# Patient Record
Sex: FEMALE | Race: WHITE | NOT HISPANIC OR LATINO
[De-identification: names, ages, dates, MRNs, and addresses within clinical notes are randomized per-mention and may not be internally consistent; named-entity substitution may affect disease eponyms.]

---

## 2017-10-25 ENCOUNTER — APPOINTMENT (OUTPATIENT)
Dept: NEUROLOGY | Facility: CLINIC | Age: 57
End: 2017-10-25
Payer: MEDICAID

## 2017-10-25 VITALS
HEIGHT: 65 IN | SYSTOLIC BLOOD PRESSURE: 110 MMHG | HEART RATE: 76 BPM | WEIGHT: 134 LBS | BODY MASS INDEX: 22.33 KG/M2 | DIASTOLIC BLOOD PRESSURE: 78 MMHG | TEMPERATURE: 98.1 F

## 2017-10-25 DIAGNOSIS — Z82.49 FAMILY HISTORY OF ISCHEMIC HEART DISEASE AND OTHER DISEASES OF THE CIRCULATORY SYSTEM: ICD-10-CM

## 2017-10-25 DIAGNOSIS — Z87.09 PERSONAL HISTORY OF OTHER DISEASES OF THE RESPIRATORY SYSTEM: ICD-10-CM

## 2017-10-25 DIAGNOSIS — Z86.19 PERSONAL HISTORY OF OTHER INFECTIOUS AND PARASITIC DISEASES: ICD-10-CM

## 2017-10-25 DIAGNOSIS — Z86.69 PERSONAL HISTORY OF OTHER DISEASES OF THE NERVOUS SYSTEM AND SENSE ORGANS: ICD-10-CM

## 2017-10-25 DIAGNOSIS — Z80.9 FAMILY HISTORY OF MALIGNANT NEOPLASM, UNSPECIFIED: ICD-10-CM

## 2017-10-25 DIAGNOSIS — Z87.898 PERSONAL HISTORY OF OTHER SPECIFIED CONDITIONS: ICD-10-CM

## 2017-10-25 DIAGNOSIS — Z80.0 FAMILY HISTORY OF MALIGNANT NEOPLASM OF DIGESTIVE ORGANS: ICD-10-CM

## 2017-10-25 DIAGNOSIS — F17.200 NICOTINE DEPENDENCE, UNSPECIFIED, UNCOMPLICATED: ICD-10-CM

## 2017-10-25 DIAGNOSIS — F15.90 OTHER STIMULANT USE, UNSPECIFIED, UNCOMPLICATED: ICD-10-CM

## 2017-10-25 PROBLEM — Z00.00 ENCOUNTER FOR PREVENTIVE HEALTH EXAMINATION: Noted: 2017-10-25

## 2017-10-25 PROCEDURE — 99205 OFFICE O/P NEW HI 60 MIN: CPT

## 2017-10-30 ENCOUNTER — OUTPATIENT (OUTPATIENT)
Dept: OUTPATIENT SERVICES | Facility: HOSPITAL | Age: 57
LOS: 1 days | End: 2017-10-30
Payer: MEDICAID

## 2017-10-30 DIAGNOSIS — G40.909 EPILEPSY, UNSPECIFIED, NOT INTRACTABLE, WITHOUT STATUS EPILEPTICUS: ICD-10-CM

## 2017-10-30 PROCEDURE — 95819 EEG AWAKE AND ASLEEP: CPT

## 2017-10-30 PROCEDURE — 95957 EEG DIGITAL ANALYSIS: CPT

## 2017-10-30 PROCEDURE — 95819 EEG AWAKE AND ASLEEP: CPT | Mod: 26

## 2017-10-30 NOTE — EEG REPORT - COMMENTS
This awake EEG is within normal limits.  Sleep was not recorded.    Ganga Honeycutt MD  EEG / Epilepsy Attending Physician

## 2017-12-05 ENCOUNTER — RESULT REVIEW (OUTPATIENT)
Age: 57
End: 2017-12-05

## 2018-01-29 ENCOUNTER — RESULT REVIEW (OUTPATIENT)
Age: 58
End: 2018-01-29

## 2018-04-18 ENCOUNTER — APPOINTMENT (OUTPATIENT)
Dept: NEUROLOGY | Facility: CLINIC | Age: 58
End: 2018-04-18
Payer: MEDICAID

## 2018-04-18 VITALS
SYSTOLIC BLOOD PRESSURE: 112 MMHG | BODY MASS INDEX: 22.82 KG/M2 | HEIGHT: 65 IN | TEMPERATURE: 97.8 F | WEIGHT: 137 LBS | DIASTOLIC BLOOD PRESSURE: 78 MMHG | HEART RATE: 94 BPM

## 2018-04-18 PROCEDURE — 99214 OFFICE O/P EST MOD 30 MIN: CPT

## 2018-07-02 ENCOUNTER — RESULT REVIEW (OUTPATIENT)
Age: 58
End: 2018-07-02

## 2018-07-05 ENCOUNTER — APPOINTMENT (OUTPATIENT)
Dept: NEUROLOGY | Facility: CLINIC | Age: 58
End: 2018-07-05

## 2018-08-13 ENCOUNTER — APPOINTMENT (OUTPATIENT)
Dept: NEUROLOGY | Facility: CLINIC | Age: 58
End: 2018-08-13
Payer: MEDICAID

## 2018-08-13 VITALS
SYSTOLIC BLOOD PRESSURE: 106 MMHG | HEIGHT: 65 IN | OXYGEN SATURATION: 98 % | TEMPERATURE: 97.7 F | WEIGHT: 138 LBS | DIASTOLIC BLOOD PRESSURE: 76 MMHG | BODY MASS INDEX: 22.99 KG/M2 | HEART RATE: 76 BPM

## 2018-08-13 PROCEDURE — 99214 OFFICE O/P EST MOD 30 MIN: CPT

## 2018-08-13 RX ORDER — LACOSAMIDE 100 MG/1
100 TABLET, FILM COATED ORAL
Qty: 60 | Refills: 5 | Status: DISCONTINUED | COMMUNITY
End: 2018-08-13

## 2018-08-21 ENCOUNTER — APPOINTMENT (OUTPATIENT)
Dept: NEUROLOGY | Facility: CLINIC | Age: 58
End: 2018-08-21
Payer: MEDICAID

## 2018-08-21 PROCEDURE — 95819 EEG AWAKE AND ASLEEP: CPT

## 2018-09-04 ENCOUNTER — OTHER (OUTPATIENT)
Age: 58
End: 2018-09-04

## 2018-11-26 ENCOUNTER — APPOINTMENT (OUTPATIENT)
Dept: NEUROLOGY | Facility: CLINIC | Age: 58
End: 2018-11-26
Payer: MEDICAID

## 2018-11-26 VITALS
SYSTOLIC BLOOD PRESSURE: 102 MMHG | WEIGHT: 135 LBS | TEMPERATURE: 98 F | OXYGEN SATURATION: 98 % | DIASTOLIC BLOOD PRESSURE: 70 MMHG | BODY MASS INDEX: 22.49 KG/M2 | HEIGHT: 65 IN | HEART RATE: 88 BPM

## 2018-11-26 PROCEDURE — 95886 MUSC TEST DONE W/N TEST COMP: CPT | Mod: 50

## 2018-11-26 PROCEDURE — 95913 NRV CNDJ TEST 13/> STUDIES: CPT

## 2018-12-06 ENCOUNTER — APPOINTMENT (OUTPATIENT)
Dept: NEUROLOGY | Facility: CLINIC | Age: 58
End: 2018-12-06
Payer: MEDICAID

## 2018-12-06 VITALS
SYSTOLIC BLOOD PRESSURE: 130 MMHG | WEIGHT: 131 LBS | HEART RATE: 80 BPM | HEIGHT: 65 IN | BODY MASS INDEX: 21.83 KG/M2 | OXYGEN SATURATION: 98 % | DIASTOLIC BLOOD PRESSURE: 77 MMHG

## 2018-12-06 DIAGNOSIS — R29.898 OTHER SYMPTOMS AND SIGNS INVOLVING THE MUSCULOSKELETAL SYSTEM: ICD-10-CM

## 2018-12-06 PROCEDURE — 99214 OFFICE O/P EST MOD 30 MIN: CPT

## 2018-12-06 RX ORDER — LEVETIRACETAM 750 MG/1
750 TABLET, FILM COATED ORAL TWICE DAILY
Qty: 120 | Refills: 5 | Status: DISCONTINUED | COMMUNITY
End: 2018-12-06

## 2018-12-07 LAB
FOLATE SERPL-MCNC: >20 NG/ML
HBA1C MFR BLD HPLC: 5.8 %
T PALLIDUM AB SER QL IA: NEGATIVE
T3 SERPL-MCNC: 125 NG/DL
T4 SERPL-MCNC: 8 UG/DL
TSH SERPL-ACNC: 2.07 UIU/ML
VIT B12 SERPL-MCNC: 869 PG/ML

## 2018-12-13 LAB — METHYLMALONATE SERPL-SCNC: 256 NMOL/L

## 2019-01-16 ENCOUNTER — OUTPATIENT (OUTPATIENT)
Dept: OUTPATIENT SERVICES | Facility: HOSPITAL | Age: 59
LOS: 1 days | End: 2019-01-16
Payer: MEDICAID

## 2019-01-16 ENCOUNTER — APPOINTMENT (OUTPATIENT)
Dept: MRI IMAGING | Facility: HOSPITAL | Age: 59
End: 2019-01-16
Payer: MEDICAID

## 2019-01-16 DIAGNOSIS — R41.3 OTHER AMNESIA: ICD-10-CM

## 2019-01-16 PROCEDURE — 70551 MRI BRAIN STEM W/O DYE: CPT

## 2019-01-16 PROCEDURE — 70551 MRI BRAIN STEM W/O DYE: CPT | Mod: 26

## 2019-03-11 ENCOUNTER — APPOINTMENT (OUTPATIENT)
Dept: NEUROLOGY | Facility: CLINIC | Age: 59
End: 2019-03-11
Payer: MEDICAID

## 2019-03-11 VITALS
OXYGEN SATURATION: 98 % | SYSTOLIC BLOOD PRESSURE: 118 MMHG | HEIGHT: 65 IN | WEIGHT: 131 LBS | BODY MASS INDEX: 21.83 KG/M2 | HEART RATE: 70 BPM | DIASTOLIC BLOOD PRESSURE: 76 MMHG | TEMPERATURE: 98 F

## 2019-03-11 DIAGNOSIS — R41.3 OTHER AMNESIA: ICD-10-CM

## 2019-03-11 DIAGNOSIS — G56.03 CARPAL TUNNEL SYNDROM,BILATERAL UPPER LIMBS: ICD-10-CM

## 2019-03-11 PROCEDURE — 99214 OFFICE O/P EST MOD 30 MIN: CPT

## 2019-03-11 NOTE — DATA REVIEWED
[de-identified] : images reviwed: EXAM: MR BRAIN \par \par \par PROCEDURE DATE: 01/16/2019 \par \par \par \par INTERPRETATION: CLINICAL STATEMENT: Memory problem \par \par TECHNIQUE: MRI of the brain was performed without gadolinium. \par \par COMPARISON: None. \par \par FINDINGS: \par There are few T2 prolongation signal abnormalities in the periventricular \par subcortical white matter likely related to mild chronic microvascular \par ischemic changes. \par \par Incidental focal gradient susceptibility effect noted in the darwin which may \par be related to chronic microhemorrhage or cavernous angioma.. \par \par There is no acute parenchymal hemorrhage, mass effect or midline shift. \par There is no extra-axial fluid collection. There is no hydrocephalus. There \par is no acute infarct. \par \par Mucosal thickening paranasal sinuses with retention cyst/polyps. Hypoplastic \par left maxillary sinus. Nonspecific polypoid lesions noted in the nasal cavity \par which could be better evaluated with direct visualization. \par \par Nonspecific low T1 bone marrow signal which may be related to red marrow \par hyperplasia in the correct clinical setting.  [de-identified] : HbA1C 5.8\par TFT and b12/folate normal. RPR NR.\par Bone density notable for osteopenia

## 2019-03-11 NOTE — ASSESSMENT
[FreeTextEntry1] : Epilepsy, with generalized tonic-clonic seizures, stable, last seizure was in 2013\par Will continue Keppra 1500 mg twice a day \par patient has been off Vimpat 100mg 1 tab twice a day for about 1 year\par will refer to epilepsy clinic, will need LTM\par Seizure and fall precautions\par The patient does not drive\par The patient had a bone scan shows osteopenia\par Has seizures provoked by looking at a computer and TV, blinking lights\par \par Carpal tunnel syndrome bilaterally, mild; improved with splints\par wrist splint during sleep as needed\par \par Memory problem is likely related to normal aging but will refer for LTM to evaluate for non convulsive seiizures\par \par Osteopenia\par on ca/vit D\par patient will fu with PMD

## 2019-03-11 NOTE — HISTORY OF PRESENT ILLNESS
[FreeTextEntry1] : Patient has history of epilepsy and is here for seizures. Seizures started at age 11, characterized to start a staring spells with eye rolling. At age 21, the patient started having generalized tonic-clonic seizures with tongue bite and no bowel bladder incontinence. She has an aura, described as feeling as a zombie about 2 hours prior to the event. The seizures are usually triggered by blinking lights, sirens, TV and computer screens. The patient has been taking Keppra 750 mg 2 tablets twice a day as well as Vimpat 100 mg twice a day. The Vimpat was started 2013, she has been off since 2018.  The patient has been previously on Dilantin and phenobarbital.\par \par Patient's last generalized tonic clonic seizure was in 2013. She says that her seizures are provoked by flashing lights, computer screen and TV; this has limited her from employment opportunities. \par \par Additionally, the patient has been having right hand weakness and numbness. She had ncs/emg showed bl CTS, patient tried wrist splints with improvement symptoms.\par \par The patient has some episodes of of memory loss going on for 2 years, described as forgetting what she is about to do or where she is going; memory returns quickly. \par \par She had a recent mechanical fall without LOC or head trauma.  No weakness or sensory symptoms, no b/b/saddle anesthesia.\par

## 2019-03-11 NOTE — PHYSICAL EXAM
[General Appearance - Alert] : alert [General Appearance - In No Acute Distress] : in no acute distress [Person] : oriented to person [Place] : oriented to place [Time] : oriented to time [Registration Intact] : recent registration memory intact [Concentration Intact] : normal concentrating ability [Visual Intact] : visual attention was ~T not ~L decreased [Comprehension] : comprehension intact [Vocabulary] : adequate range of vocabulary [Cranial Nerves Optic (II)] : visual acuity intact bilaterally,  visual fields full to confrontation, pupils equal round and reactive to light [Cranial Nerves Oculomotor (III)] : extraocular motion intact [Cranial Nerves Trigeminal (V)] : facial sensation intact symmetrically [Cranial Nerves Facial (VII)] : face symmetrical [Motor Tone] : muscle tone was normal in all four extremities [Motor Strength] : muscle strength was normal in all four extremities [Sensation Tactile Decrease] : light touch was intact [Abnormal Walk] : normal gait [Balance] : balance was intact

## 2019-03-27 ENCOUNTER — APPOINTMENT (OUTPATIENT)
Dept: NEUROLOGY | Facility: CLINIC | Age: 59
End: 2019-03-27
Payer: MEDICAID

## 2019-03-27 VITALS
BODY MASS INDEX: 21.83 KG/M2 | SYSTOLIC BLOOD PRESSURE: 126 MMHG | HEIGHT: 65 IN | WEIGHT: 131 LBS | HEART RATE: 78 BPM | DIASTOLIC BLOOD PRESSURE: 82 MMHG

## 2019-03-27 DIAGNOSIS — G40.909 EPILEPSY, UNSPECIFIED, NOT INTRACTABLE, W/OUT STATUS EPILEPTICUS: ICD-10-CM

## 2019-03-27 PROCEDURE — 99214 OFFICE O/P EST MOD 30 MIN: CPT

## 2019-03-27 NOTE — DISCUSSION/SUMMARY
[Well-controlled] : well-controlled [Generalized] : generalized  [Risks Associated with Driving/[place] Law] : As per my usual protocol, the patient was advised in regards to risks and driving privileges associated with the [place] Guidelines.  [Safety Recommendations] : The patient was advised in regards to the risk of seizures and general seizure safety recommendations including not to be bathing alone, climbing to high places and operating heavy machinery. [Compliance with Medications] : The importance of compliance with medications was reinforced. [Medication Side Effects] : High frequency and serious potential medication adverse effects were reviewed with the patient, including but not exclusive to psychiatric effects.  Information sheets on medication side effects were made available to the patient in our clinic.  The patient or advocate agrees to notify us for any concerns. [Bone Health Education] : Patient was educated in regards to bone health and an increased risk of osteoporosis in patients with epilepsy. [Sleep Hygiene/Sleep Disruption Risks] : Sleep hygiene and the risks of sleep disruption were discussed. [EMU Consent:] : As per our usual protocol, staff discussed video EEG monitoring for the purpose of diagnosis, treatment decisions, teaching, research or publication (if de-identified).  Patient aware recording is continuous (24hrs/day), that they may be under constant observation (with exceptions), and that portions of the video EEG will be stored digitally.  In some cases AEDs may be adjusted to allow for observable seizure activity. The anticipated benefits of the study, the foreseeable risks associated with the procedure including experiencing seizures, which could be more severe than usual. The risk from seizures includes falls, fractures, muscle injury, dental injury, postictal psychiatric symptoms, and heart rate or breathing abnormalities. There will be a risk of experiencing skin irritation or breakdown from the electrodes being placed on the skin.

## 2019-03-30 PROBLEM — G40.909 EPILEPSY: Noted: 2017-10-25

## 2019-03-30 NOTE — PHYSICAL EXAM
[FreeTextEntry1] : MS: Awake Alert and Oriented to person time and place \par Follows simple and complex commands\par No anomia, No aphasia, No dysarthria \par CN: PERRL (irregular left pupil shape), EOMI, VF intact, no facial asymmetry, tongue midline, shoulder shrug equal\par Motor: 5/5 throughout no drift\par Sensation: intact to LT\par DTRs: 2+ throughout\par Coordination: FTN intact, no gross tremors \par Gait: intact \par

## 2019-03-30 NOTE — END OF VISIT
[FreeTextEntry3] : Ms. TALIA DAS was seen with Epilepsy fellow, Dr. Trini Howell.  I reviewed history with patient and family, examined Ms. DAS , and edited the note.

## 2019-03-30 NOTE — ASSESSMENT
[FreeTextEntry1] : MS. GARCIA is a 58 yr old LH F whom presents  consultation for management of known  seizure disorder. Doing relatively well on levetiracetam 1500 q12, but has history of break through convulsion once or twice a year. \par \par *Possible Primary Generalized Seizures\par -EMU for interictal evaluation; NEEDS PHOTIC STIMULATION. During EMU eval will decrease levetiracetam 750 bid and monitor interictal. Resume levetiracetam 1500 at discharge. May also consider starting zonisamide or topiramate at discharge, depending on findings. \par -c/w LEV 1500mg bid \par -RTC 2 mo (preferably Weds afternoon)\par \par Greater than 50% of the encounter time was spent on counseling and coordination of care for reviewing records in [employer], discussion with patient regarding plan. I have spent 60 minutes of face to face time with the patient reviewing the cause of seizures or seizure-like events, assessing the risk of recurrence, educating the patient or family to recognize seizures, and discussing possible treatment options and possible side effects of seizure medications. I also discussed seizure safety, and ways of reducing seizure risk. \par
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
## 2019-06-19 ENCOUNTER — APPOINTMENT (OUTPATIENT)
Dept: NEUROLOGY | Facility: CLINIC | Age: 59
End: 2019-06-19
Payer: MEDICAID

## 2019-06-19 VITALS
SYSTOLIC BLOOD PRESSURE: 132 MMHG | DIASTOLIC BLOOD PRESSURE: 82 MMHG | BODY MASS INDEX: 23.32 KG/M2 | HEIGHT: 65 IN | WEIGHT: 140 LBS | HEART RATE: 76 BPM

## 2019-06-19 DIAGNOSIS — Z82.0 FAMILY HISTORY OF EPILEPSY AND OTHER DISEASES OF THE NERVOUS SYSTEM: ICD-10-CM

## 2019-06-19 PROCEDURE — 99213 OFFICE O/P EST LOW 20 MIN: CPT
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
## 2019-06-19 NOTE — ASSESSMENT
[FreeTextEntry1] : MS. GARCIA is a 58 yr old LH F whom presents  consultation for management of known  seizure disorder. Doing relatively well on levetiracetam 1500 q12, but has history of break through convulsion once or twice a year. Ms. DAS did not get EMU eval - plan is to better characterize seizure type in order to select optimal treatment.  \par \par *Possible Primary Generalized Seizures\par -EMU for interictal evaluation; NEEDS PHOTIC STIMULATION. During EMU eval will decrease levetiracetam 750 bid and monitor interictal. Resume levetiracetam 1500 at discharge. May also consider starting zonisamide at discharge, depending on findings. \par -c/w LEV 1500mg bid \par -RTC 4-6 wk\par

## 2019-07-01 ENCOUNTER — INPATIENT (INPATIENT)
Facility: HOSPITAL | Age: 59
LOS: 1 days | Discharge: HOME | DRG: 101 | End: 2019-07-03
Attending: PSYCHIATRY & NEUROLOGY | Admitting: PSYCHIATRY & NEUROLOGY
Payer: MEDICAID

## 2019-07-01 ENCOUNTER — OUTPATIENT (OUTPATIENT)
Dept: OUTPATIENT SERVICES | Facility: HOSPITAL | Age: 59
LOS: 1 days | End: 2019-07-01

## 2019-07-01 VITALS
HEART RATE: 61 BPM | RESPIRATION RATE: 18 BRPM | DIASTOLIC BLOOD PRESSURE: 80 MMHG | HEIGHT: 66 IN | SYSTOLIC BLOOD PRESSURE: 126 MMHG | TEMPERATURE: 98 F | OXYGEN SATURATION: 94 % | WEIGHT: 132.28 LBS

## 2019-07-01 DIAGNOSIS — G40.909 EPILEPSY, UNSPECIFIED, NOT INTRACTABLE, WITHOUT STATUS EPILEPTICUS: ICD-10-CM

## 2019-07-01 LAB
ALBUMIN SERPL ELPH-MCNC: 4.2 G/DL — SIGNIFICANT CHANGE UP (ref 3.3–5)
ALP SERPL-CCNC: 47 U/L — SIGNIFICANT CHANGE UP (ref 40–120)
ALT FLD-CCNC: 10 U/L — SIGNIFICANT CHANGE UP (ref 10–45)
ANION GAP SERPL CALC-SCNC: 12 MMOL/L — SIGNIFICANT CHANGE UP (ref 5–17)
AST SERPL-CCNC: 16 U/L — SIGNIFICANT CHANGE UP (ref 10–40)
BASOPHILS # BLD AUTO: 0.09 K/UL — SIGNIFICANT CHANGE UP (ref 0–0.2)
BASOPHILS NFR BLD AUTO: 1.4 % — SIGNIFICANT CHANGE UP (ref 0–2)
BILIRUB SERPL-MCNC: 0.4 MG/DL — SIGNIFICANT CHANGE UP (ref 0.2–1.2)
BUN SERPL-MCNC: 13 MG/DL — SIGNIFICANT CHANGE UP (ref 7–23)
CALCIUM SERPL-MCNC: 9.3 MG/DL — SIGNIFICANT CHANGE UP (ref 8.4–10.5)
CHLORIDE SERPL-SCNC: 100 MMOL/L — SIGNIFICANT CHANGE UP (ref 96–108)
CO2 SERPL-SCNC: 26 MMOL/L — SIGNIFICANT CHANGE UP (ref 22–31)
CREAT SERPL-MCNC: 0.89 MG/DL — SIGNIFICANT CHANGE UP (ref 0.5–1.3)
EOSINOPHIL # BLD AUTO: 0.24 K/UL — SIGNIFICANT CHANGE UP (ref 0–0.5)
EOSINOPHIL NFR BLD AUTO: 3.6 % — SIGNIFICANT CHANGE UP (ref 0–6)
GLUCOSE SERPL-MCNC: 92 MG/DL — SIGNIFICANT CHANGE UP (ref 70–99)
HCT VFR BLD CALC: 39.9 % — SIGNIFICANT CHANGE UP (ref 34.5–45)
HGB BLD-MCNC: 13.2 G/DL — SIGNIFICANT CHANGE UP (ref 11.5–15.5)
IMM GRANULOCYTES NFR BLD AUTO: 0.2 % — SIGNIFICANT CHANGE UP (ref 0–1.5)
LYMPHOCYTES # BLD AUTO: 2.34 K/UL — SIGNIFICANT CHANGE UP (ref 1–3.3)
LYMPHOCYTES # BLD AUTO: 35.6 % — SIGNIFICANT CHANGE UP (ref 13–44)
MCHC RBC-ENTMCNC: 31.6 PG — SIGNIFICANT CHANGE UP (ref 27–34)
MCHC RBC-ENTMCNC: 33.1 GM/DL — SIGNIFICANT CHANGE UP (ref 32–36)
MCV RBC AUTO: 95.5 FL — SIGNIFICANT CHANGE UP (ref 80–100)
MONOCYTES # BLD AUTO: 0.7 K/UL — SIGNIFICANT CHANGE UP (ref 0–0.9)
MONOCYTES NFR BLD AUTO: 10.6 % — SIGNIFICANT CHANGE UP (ref 2–14)
NEUTROPHILS # BLD AUTO: 3.2 K/UL — SIGNIFICANT CHANGE UP (ref 1.8–7.4)
NEUTROPHILS NFR BLD AUTO: 48.6 % — SIGNIFICANT CHANGE UP (ref 43–77)
PLATELET # BLD AUTO: 199 K/UL — SIGNIFICANT CHANGE UP (ref 150–400)
POTASSIUM SERPL-MCNC: 3.9 MMOL/L — SIGNIFICANT CHANGE UP (ref 3.5–5.3)
POTASSIUM SERPL-SCNC: 3.9 MMOL/L — SIGNIFICANT CHANGE UP (ref 3.5–5.3)
PROT SERPL-MCNC: 6.9 G/DL — SIGNIFICANT CHANGE UP (ref 6–8.3)
RBC # BLD: 4.18 M/UL — SIGNIFICANT CHANGE UP (ref 3.8–5.2)
RBC # FLD: 12.8 % — SIGNIFICANT CHANGE UP (ref 10.3–14.5)
SODIUM SERPL-SCNC: 138 MMOL/L — SIGNIFICANT CHANGE UP (ref 135–145)
WBC # BLD: 6.58 K/UL — SIGNIFICANT CHANGE UP (ref 3.8–10.5)
WBC # FLD AUTO: 6.58 K/UL — SIGNIFICANT CHANGE UP (ref 3.8–10.5)

## 2019-07-01 PROCEDURE — 95816 EEG AWAKE AND DROWSY: CPT | Mod: 26

## 2019-07-01 PROCEDURE — 99222 1ST HOSP IP/OBS MODERATE 55: CPT

## 2019-07-01 RX ORDER — LEVETIRACETAM 250 MG/1
2000 TABLET, FILM COATED ORAL EVERY 12 HOURS
Refills: 0 | Status: DISCONTINUED | OUTPATIENT
Start: 2019-07-01 | End: 2019-07-03

## 2019-07-01 RX ORDER — LEVETIRACETAM 250 MG/1
750 TABLET, FILM COATED ORAL
Refills: 0 | Status: DISCONTINUED | OUTPATIENT
Start: 2019-07-01 | End: 2019-07-01

## 2019-07-01 RX ORDER — ENOXAPARIN SODIUM 100 MG/ML
40 INJECTION SUBCUTANEOUS EVERY 24 HOURS
Refills: 0 | Status: DISCONTINUED | OUTPATIENT
Start: 2019-07-01 | End: 2019-07-03

## 2019-07-01 RX ORDER — LEVETIRACETAM 250 MG/1
750 TABLET, FILM COATED ORAL
Refills: 0 | Status: DISCONTINUED | OUTPATIENT
Start: 2019-07-01 | End: 2019-07-02

## 2019-07-01 RX ORDER — LEVETIRACETAM 250 MG/1
1500 TABLET, FILM COATED ORAL
Refills: 0 | Status: DISCONTINUED | OUTPATIENT
Start: 2019-07-01 | End: 2019-07-01

## 2019-07-01 NOTE — H&P ADULT - HISTORY OF PRESENT ILLNESS
59 year old female being seen for a medically refractory epilepsy. The seizures began as Absence seizures until age 21 when she had was in labor she had her first Gran Mal seizure. She describes "feeling like a zombie" for the hours preceding seizure. She wakes up in the morning with strange feeling that may persist for 3-5 hours until culminates with seizure. She is described as "repeating herself" prior to seizure - perhaps up to an hour. She previously took Lacosamide with refractory seizures while taking this medication and difficulty obtaining the prescription due to controlled status. Also previously tried Dilantin without success which was switched to Keppra. She endorses compliance on Keppra and denies irritability or depressed mood. She reports no medication side effects.    Description of seizure: tonic-clonic seizure -like, associated with postictal confusion. Aura: flaky, feeling off . Associated with tongue bite. Once she gets an Aura it can take as long as 3 hours until the convulsion.  Duration: 3 minutes   Frequency: usually occurs in cluster of 2; 1 to 2 times a year -- Last: 2018  Triggers: bright lights, alarms

## 2019-07-01 NOTE — H&P ADULT - NSICDXPASTMEDICALHX_GEN_ALL_CORE_FT
PAST MEDICAL HISTORY:  Carpal tunnel syndrome     History of memory loss     Migraine     Osteopenia     Seizure disorder

## 2019-07-01 NOTE — H&P ADULT - NSHPREVIEWOFSYSTEMS_GEN_ALL_CORE
CONSTITUTIONAL: No weakness, fevers or chills  	EYES/ENT: No visual changes;  No vertigo or throat pain   	NECK: No pain or stiffness  	RESPIRATORY: No cough, wheezing, hemoptysis; No shortness of breath  	CARDIOVASCULAR: No chest pain or palpitations  	GASTROINTESTINAL: No abdominal or epigastric pain. No nausea, vomiting, or hematemesis; No diarrhea or constipation. No melena or hematochezia.  	GENITOURINARY: No dysuria, frequency or hematuria  	NEUROLOGICAL: No numbness or weakness                SKIN: No itching, rashes

## 2019-07-01 NOTE — H&P ADULT - NSHPPHYSICALEXAM_GEN_ALL_CORE
Physical Exam: Neurological Exam:  	Mental Status: Orientated to self, date and place.  Attention intact.  No dysarthria, aphasia or neglect.  Knowledge intact.  Registration intact.  Short and long term memory grossly intact.      	Cranial Nerves: CN I - not tested.  PERRL, EOMI, VFF, no nystagmus or diplopia.  No APD.  Fundi not visualized bilaterally.  CN V1-3 intact to light touch and pinprick.  No facial asymmetry.  Hearing intact to finger rub bilaterally.  Tongue, uvula and palate midline.  Sternocleidomastoid and Trapezius intact bilaterally.    	Motor:   	Tone: normal.                  	Strength: intact throughout  	Pronator drift: none                 	Dysmetria: None to finger-nose-finger or heel-shin-heel  	No truncal ataxia.    	Tremor: No resting, postural or action tremor.  No myoclonus.    	Sensation: intact to light touch, pinprick, vibration and proprioception    	Deep Tendon Reflexes: 1+ bilateral biceps, triceps, brachioradialis, knee and ankle  	Toes flexor bilaterally    	Gait: normal and stable.

## 2019-07-01 NOTE — H&P ADULT - ATTENDING COMMENTS
The patient and family member were seen personally by myself, and together in conjunction with the inpatient neurology housestaff team.  I agree with above history, physical, assessment and plan which I have reviewed and edited where appropriate. I was physically present for the key portions of the service provided. Total interview duration, time alotted for patient questions, review of prior medical records / charts, prior diagnostic data / studies, and time for medical documentation was greater than 60 minutes.      The patient is under evaluation with VEEG for diagnostic evaluation of paroxsymal events, for the confirmation of the diagnosis of epilepsy, syndromic classification, and possible characterization and localization of the patient's seizures if epileptic.    The patient agrees to the relative risks and benefits of hospitalization, continuous video and EEG monitoring with recording and storage of patient data, as well as the possibility of medication withdrawal with the potential to elicit events.    IV placement for access in case of prolonged seizures and need for intravenous medications. Ativan 1mg IV ok for generalized convulsions, seizures lasting longer than 5 minutes, or for more than three seizures per hour.  Plan to have nursing care immediately at bedside for clinical events, vital signs during events, and suction and oxygen at bedside.  As per my usual protocol, general seizure safety tips were reviewed with the patient.  Plan discussed with team.  For emergencies or other issues the nursing staff and residents may contact me at the [facility] at [phone] with 24 hr coverage available.    Plan was discussed with Dr. De La Vega

## 2019-07-02 LAB
APPEARANCE UR: CLEAR — SIGNIFICANT CHANGE UP
APTT BLD: 23.8 SEC — LOW (ref 27.5–36.3)
BACTERIA # UR AUTO: NEGATIVE — SIGNIFICANT CHANGE UP
BILIRUB UR-MCNC: NEGATIVE — SIGNIFICANT CHANGE UP
COD CRY URNS QL: ABNORMAL
COLOR SPEC: YELLOW — SIGNIFICANT CHANGE UP
DIFF PNL FLD: NEGATIVE — SIGNIFICANT CHANGE UP
EPI CELLS # UR: 4 /HPF — SIGNIFICANT CHANGE UP (ref 0–5)
GLUCOSE UR QL: NEGATIVE — SIGNIFICANT CHANGE UP
HCV AB S/CO SERPL IA: 0.08 S/CO — SIGNIFICANT CHANGE UP (ref 0–0.99)
HCV AB SERPL-IMP: SIGNIFICANT CHANGE UP
HYALINE CASTS # UR AUTO: 0 /LPF — SIGNIFICANT CHANGE UP (ref 0–7)
INR BLD: 1.12 RATIO — SIGNIFICANT CHANGE UP (ref 0.88–1.16)
KETONES UR-MCNC: NEGATIVE — SIGNIFICANT CHANGE UP
LEUKOCYTE ESTERASE UR-ACNC: ABNORMAL
LEVETIRACETAM SERPL-MCNC: 80 MCG/ML — HIGH (ref 12–46)
NITRITE UR-MCNC: NEGATIVE — SIGNIFICANT CHANGE UP
PH UR: 6.5 — SIGNIFICANT CHANGE UP (ref 5–8)
PROT UR-MCNC: NEGATIVE — SIGNIFICANT CHANGE UP
PROTHROM AB SERPL-ACNC: 12.8 SEC — SIGNIFICANT CHANGE UP (ref 10–13.1)
RBC CASTS # UR COMP ASSIST: 2 /HPF — SIGNIFICANT CHANGE UP (ref 0–4)
SP GR SPEC: 1.02 — SIGNIFICANT CHANGE UP (ref 1.01–1.02)
UROBILINOGEN FLD QL: SIGNIFICANT CHANGE UP
WBC UR QL: 10 /HPF — HIGH (ref 0–5)

## 2019-07-02 PROCEDURE — 95951: CPT | Mod: 26

## 2019-07-02 PROCEDURE — 99232 SBSQ HOSP IP/OBS MODERATE 35: CPT

## 2019-07-02 RX ORDER — LEVETIRACETAM 250 MG/1
1500 TABLET, FILM COATED ORAL
Refills: 0 | Status: DISCONTINUED | OUTPATIENT
Start: 2019-07-02 | End: 2019-07-03

## 2019-07-02 RX ORDER — DIVALPROEX SODIUM 500 MG/1
250 TABLET, DELAYED RELEASE ORAL
Refills: 0 | Status: DISCONTINUED | OUTPATIENT
Start: 2019-07-02 | End: 2019-07-03

## 2019-07-02 RX ORDER — ACETAMINOPHEN 500 MG
650 TABLET ORAL ONCE
Refills: 0 | Status: COMPLETED | OUTPATIENT
Start: 2019-07-02 | End: 2019-07-02

## 2019-07-02 RX ORDER — IBUPROFEN 200 MG
400 TABLET ORAL ONCE
Refills: 0 | Status: COMPLETED | OUTPATIENT
Start: 2019-07-02 | End: 2019-07-02

## 2019-07-02 RX ADMIN — Medication 650 MILLIGRAM(S): at 05:13

## 2019-07-02 RX ADMIN — LEVETIRACETAM 750 MILLIGRAM(S): 250 TABLET, FILM COATED ORAL at 05:13

## 2019-07-02 RX ADMIN — LEVETIRACETAM 1500 MILLIGRAM(S): 250 TABLET, FILM COATED ORAL at 17:09

## 2019-07-02 RX ADMIN — Medication 650 MILLIGRAM(S): at 05:43

## 2019-07-02 RX ADMIN — Medication 400 MILLIGRAM(S): at 10:10

## 2019-07-02 RX ADMIN — ENOXAPARIN SODIUM 40 MILLIGRAM(S): 100 INJECTION SUBCUTANEOUS at 17:18

## 2019-07-02 RX ADMIN — DIVALPROEX SODIUM 250 MILLIGRAM(S): 500 TABLET, DELAYED RELEASE ORAL at 17:18

## 2019-07-02 RX ADMIN — Medication 400 MILLIGRAM(S): at 09:38

## 2019-07-02 NOTE — PROGRESS NOTE ADULT - ASSESSMENT
59 year old female with medically refractory epilepsy despite compliance on Keppra. She has breakthrough convulsion once or twice a year with long period of aura. Today she reports return of her chronic headache.     VEEG: generalized spike and wave activity. No seizure events recorded    Plan:  Continue VEEG  Increase Keppra to 1500mg BID- home dose  Start Depakote 250mg BID  Administer Ibuprofen X 1 dose for headache  Change Seizure rescue medication to Ativan 1 mg IVP PRN , Keppra IV load for seizure activity refractory to ativan 59 year old female with medically refractory epilepsy despite compliance on Keppra. She has breakthrough convulsion once or twice a year with long period of aura. Today she reports return of her chronic headache.     VEEG: generalized spike and wave activity. No seizure events recorded    Plan:  Continue VEEG- repeat photostim today  Increase Keppra to 1500mg BID- home dose  Start Depakote 250mg BID  Administer Ibuprofen X 1 dose for headache  Change Seizure rescue medication to Ativan 1 mg IVP PRN , Keppra IV load for seizure activity refractory to ativan 59 year old female with medically refractory epilepsy despite compliance on Keppra. She has breakthrough convulsion once or twice a year with long period of aura.  recent sz 1 week ago    Today she reports return of her chronic headache.     VEEG: generalized spike and wave activity. No seizure events recorded    Plan:  Continue VEEG- repeat photostim today  Increase Keppra to 1500mg BID- home dose  Start Depakote 250mg BID  Administer Ibuprofen X 1 dose for headache  Change Seizure rescue medication to Ativan 1 mg IVP PRN , Keppra IV load for seizure activity refractory to ativan

## 2019-07-02 NOTE — PROVIDER CONTACT NOTE (OTHER) - ACTION/TREATMENT ORDERED:
As per provider, continue to monitor pt/round hourly .
Provider notified and aware.
Provider notified and aware. No new orders at this time.

## 2019-07-02 NOTE — PROGRESS NOTE ADULT - SUBJECTIVE AND OBJECTIVE BOX
Subjective: No events. Patient reports headache today and is requesting medication. She admits to frequent Headaches and was given a "seizure medication" many years ago for the headaches. She does not recall the name of the medication.     MEDICATIONS  (STANDING):  enoxaparin Injectable 40 milliGRAM(s) SubCutaneous every 24 hours  levETIRAcetam 750 milliGRAM(s) Oral two times a day    MEDICATIONS  (PRN):  levETIRAcetam  IVPB 2000 milliGRAM(s) IV Intermittent every 12 hours PRN seizure activity refractory to ativan  LORazepam   Injectable 2 milliGRAM(s) IV Push once PRN Seizure activity    Objective:  Vital Signs Last 24 Hrs  T(C): 36.8 (02 Jul 2019 09:00), Max: 36.9 (01 Jul 2019 15:45)  T(F): 98.2 (02 Jul 2019 09:00), Max: 98.4 (01 Jul 2019 15:45)  HR: 52 (02 Jul 2019 09:00) (52 - 67)  BP: 115/75 (02 Jul 2019 09:00) (103/69 - 126/80)  RR: 18 (02 Jul 2019 09:00) (18 - 18)  SpO2: 95% (02 Jul 2019 09:00) (94% - 97%)    Physical Exam: Neurological Exam:  	Mental Status: Orientated to self, date and place.  Attention intact.  No dysarthria, aphasia or neglect.     	Cranial Nerves: PERRL, EOMI, no nystagmus or diplopia. No facial asymmetry.  Hearing intact to finger rub bilaterally.  Tongue, uvula and palate midline.  Sternocleidomastoid and Trapezius intact bilaterally.    	Motor: moving all 4 extremities equally w 5/5 strength  	Tone: normal.                  	Pronator drift: none                 	Dysmetria: None to finger-nose-finger  	No truncal ataxia.    	Tremor: No resting, postural or action tremor.  No myoclonus.  	Sensation: intact to light touch   Labs:                        13.2   6.58  )-----------( 199      ( 01 Jul 2019 20:28 )             39.9     07-01    138  |  100  |  13  ----------------------------<  92  3.9   |  26  |  0.89    Ca    9.3      01 Jul 2019 17:19    TPro  6.9  /  Alb  4.2  /  TBili  0.4  /  DBili  x   /  AST  16  /  ALT  10  /  AlkPhos  47  07-01    PT/INR - ( 01 Jul 2019 20:22 )   PT: 12.8 sec;   INR: 1.12 ratio    PTT - ( 01 Jul 2019 20:22 )  PTT:23.8 sec    Urinalysis Basic - ( 02 Jul 2019 08:28 )    Color: x / Appearance: x / SG: x / pH: x  Gluc: x / Ketone: x  / Bili: x / Urobili: x   Blood: x / Protein: x / Nitrite: x   Leuk Esterase: x / RBC: 2 /HPF / WBC 10 /HPF   Sq Epi: x / Non Sq Epi: 4 /HPF / Bacteria: Negative Subjective: No events. Patient reports headache today and is requesting medication. She admits to frequent Headaches and was given a "seizure medication" many years ago for the headaches. She does not recall the name of the medication.   tremors in legs    MEDICATIONS  (STANDING):  enoxaparin Injectable 40 milliGRAM(s) SubCutaneous every 24 hours  levETIRAcetam 750 milliGRAM(s) Oral two times a day    MEDICATIONS  (PRN):  levETIRAcetam  IVPB 2000 milliGRAM(s) IV Intermittent every 12 hours PRN seizure activity refractory to ativan  LORazepam   Injectable 2 milliGRAM(s) IV Push once PRN Seizure activity    Objective:  Vital Signs Last 24 Hrs  T(C): 36.8 (02 Jul 2019 09:00), Max: 36.9 (01 Jul 2019 15:45)  T(F): 98.2 (02 Jul 2019 09:00), Max: 98.4 (01 Jul 2019 15:45)  HR: 52 (02 Jul 2019 09:00) (52 - 67)  BP: 115/75 (02 Jul 2019 09:00) (103/69 - 126/80)  RR: 18 (02 Jul 2019 09:00) (18 - 18)  SpO2: 95% (02 Jul 2019 09:00) (94% - 97%)    Physical Exam:   toothless, NAD. head wrap c/d/i. no skin breakdown    Neurological Exam:  	Mental Status: Orientated to self, date and place.  Attention intact.  No dysarthria, aphasia or neglect.   tangential    	Cranial Nerves: PERRL, EOMI, no nystagmus or diplopia. No facial asymmetry.  Hearing intact to finger rub bilaterally.  Tongue, uvula and palate midline.  Sternocleidomastoid and Trapezius intact bilaterally.    	Motor: moving all 4 extremities equally w 5/5 strength  	Tone: normal.                  	Pronator drift: none                 	Dysmetria: None to finger-nose-finger  	No truncal ataxia.    	Tremor: No resting, postural or action tremor.  No myoclonus.  	Sensation: intact to light touch   Labs:                        13.2   6.58  )-----------( 199      ( 01 Jul 2019 20:28 )             39.9     07-01    138  |  100  |  13  ----------------------------<  92  3.9   |  26  |  0.89    Ca    9.3      01 Jul 2019 17:19    TPro  6.9  /  Alb  4.2  /  TBili  0.4  /  DBili  x   /  AST  16  /  ALT  10  /  AlkPhos  47  07-01    PT/INR - ( 01 Jul 2019 20:22 )   PT: 12.8 sec;   INR: 1.12 ratio    PTT - ( 01 Jul 2019 20:22 )  PTT:23.8 sec
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
## 2019-07-03 ENCOUNTER — TRANSCRIPTION ENCOUNTER (OUTPATIENT)
Age: 59
End: 2019-07-03

## 2019-07-03 VITALS
DIASTOLIC BLOOD PRESSURE: 76 MMHG | HEART RATE: 66 BPM | RESPIRATION RATE: 18 BRPM | OXYGEN SATURATION: 96 % | SYSTOLIC BLOOD PRESSURE: 117 MMHG

## 2019-07-03 DIAGNOSIS — Z71.89 OTHER SPECIFIED COUNSELING: ICD-10-CM

## 2019-07-03 PROCEDURE — 95951: CPT | Mod: 26

## 2019-07-03 PROCEDURE — 99238 HOSP IP/OBS DSCHRG MGMT 30/<: CPT

## 2019-07-03 RX ORDER — LEVETIRACETAM 250 MG/1
2 TABLET, FILM COATED ORAL
Qty: 0 | Refills: 0 | DISCHARGE

## 2019-07-03 RX ORDER — DIVALPROEX SODIUM 500 MG/1
1 TABLET, DELAYED RELEASE ORAL
Qty: 0 | Refills: 0 | DISCHARGE
Start: 2019-07-03

## 2019-07-03 RX ORDER — LEVETIRACETAM 250 MG/1
1 TABLET, FILM COATED ORAL
Qty: 0 | Refills: 0 | DISCHARGE

## 2019-07-03 RX ORDER — DIVALPROEX SODIUM 500 MG/1
1 TABLET, DELAYED RELEASE ORAL
Qty: 60 | Refills: 0
Start: 2019-07-03 | End: 2019-08-01

## 2019-07-03 RX ADMIN — LEVETIRACETAM 1500 MILLIGRAM(S): 250 TABLET, FILM COATED ORAL at 05:01

## 2019-07-03 RX ADMIN — DIVALPROEX SODIUM 250 MILLIGRAM(S): 500 TABLET, DELAYED RELEASE ORAL at 05:01

## 2019-07-03 NOTE — DISCHARGE NOTE PROVIDER - CARE PROVIDER_API CALL
Marc De La Vega (MD)  Clinical Neurophysiology; EEGEpilepsy; Neurology  [address]  [place] [address]  Phone: [phone]  Fax: [phone]  Follow Up Time: 2 weeks

## 2019-07-03 NOTE — DISCHARGE NOTE NURSING/CASE MANAGEMENT/SOCIAL WORK - NSDCPEPTSTRK_GEN_ALL_CORE
Stroke education booklet/Prescribed medications/Stroke warning signs and symptoms/Signs and symptoms of stroke/Call 911 for stroke/Need for follow up after discharge/Risk factors for stroke/Stroke support groups for patients, families, and friends

## 2019-07-03 NOTE — DISCHARGE NOTE PROVIDER - NSDCCPCAREPLAN_GEN_ALL_CORE_FT
PRINCIPAL DISCHARGE DIAGNOSIS  Diagnosis: Refractory epilepsy  Assessment and Plan of Treatment: Follow up with your Primary Care Physician within the next 2-3 days   Follow up with your Neurologist Dr De La Vega within the next 2 weeks  Bring a copy of your test results with you to your appointment  Continue your current medication regimen of Keppra 1500mg twice daily  Start Depakote 250mg twice daily  Contact your Neurologist, Primary Care Provider or report to the Emergency Room if you experience new or worsening symptoms
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
## 2019-07-03 NOTE — DISCHARGE NOTE NURSING/CASE MANAGEMENT/SOCIAL WORK - NSDCDPATPORTLINK_GEN_ALL_CORE
You can access the [facility] Portal, offered by [facility], by registering with the following website: [email or web address]

## 2019-07-10 PROCEDURE — 81001 URINALYSIS AUTO W/SCOPE: CPT

## 2019-07-10 PROCEDURE — 95816 EEG AWAKE AND DROWSY: CPT

## 2019-07-10 PROCEDURE — 85027 COMPLETE CBC AUTOMATED: CPT

## 2019-07-10 PROCEDURE — 80053 COMPREHEN METABOLIC PANEL: CPT

## 2019-07-10 PROCEDURE — 80177 DRUG SCRN QUAN LEVETIRACETAM: CPT

## 2019-07-10 PROCEDURE — 85610 PROTHROMBIN TIME: CPT

## 2019-07-10 PROCEDURE — 85730 THROMBOPLASTIN TIME PARTIAL: CPT

## 2019-07-10 PROCEDURE — 86803 HEPATITIS C AB TEST: CPT

## 2019-07-10 PROCEDURE — 95951: CPT

## 2019-07-31 ENCOUNTER — APPOINTMENT (OUTPATIENT)
Dept: NEUROLOGY | Facility: CLINIC | Age: 59
End: 2019-07-31
Payer: MEDICAID

## 2019-07-31 VITALS
WEIGHT: 146 LBS | HEIGHT: 65 IN | DIASTOLIC BLOOD PRESSURE: 77 MMHG | SYSTOLIC BLOOD PRESSURE: 114 MMHG | HEART RATE: 62 BPM | BODY MASS INDEX: 24.32 KG/M2

## 2019-07-31 DIAGNOSIS — G40.311 GENERALIZED IDIOPATHIC EPILEPSY AND EPILEPTIC SYNDROMES, INTRACTABLE, WITH STATUS EPILEPTICUS: ICD-10-CM

## 2019-07-31 PROCEDURE — 99214 OFFICE O/P EST MOD 30 MIN: CPT

## 2019-07-31 NOTE — ASSESSMENT
[FreeTextEntry1] : MS. GARCIA is a 58 yr old LH F whom presents  consultation for management of known  seizure disorder. Doing relatively well on levetiracetam 1500 q12, but has history of break through convulsion once or twice a year. 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 divalproex was added at conclusion of eval, and since then Ms. DAS has been feeling some side effects. \par \par Plan:\par Primary Generalized Seizures - likely MARIO\par -change levetiracetam ER 1500mg bid \par -continue divalproex 250 q12\par -RTC 6 wks\par -outpatient EEG x 4 h - on day of next visit. \par \par I have spent 25 minutes of face to face time with the patient reviewing the cause of seizures or seizure-like events, assessing the risk of recurrence, educating the patient or family to recognize seizures, and discussing possible treatment options and possible side effects of seizure medications. I also discussed seizure safety, and ways of reducing seizure risk. Greater than 50% of the encounter time was spent on counseling and coordination of care for reviewing records in [employer], discussion with patient regarding plan.
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
## 2019-08-01 PROBLEM — M85.80 OTHER SPECIFIED DISORDERS OF BONE DENSITY AND STRUCTURE, UNSPECIFIED SITE: Chronic | Status: ACTIVE | Noted: 2019-07-01

## 2019-08-01 PROBLEM — Z87.898 PERSONAL HISTORY OF OTHER SPECIFIED CONDITIONS: Chronic | Status: ACTIVE | Noted: 2019-07-01

## 2019-08-01 PROBLEM — G43.909 MIGRAINE, UNSPECIFIED, NOT INTRACTABLE, WITHOUT STATUS MIGRAINOSUS: Chronic | Status: ACTIVE | Noted: 2019-07-01

## 2019-08-01 PROBLEM — G56.00 CARPAL TUNNEL SYNDROME, UNSPECIFIED UPPER LIMB: Chronic | Status: ACTIVE | Noted: 2019-07-01

## 2019-08-01 PROBLEM — G40.909 EPILEPSY, UNSPECIFIED, NOT INTRACTABLE, WITHOUT STATUS EPILEPTICUS: Chronic | Status: ACTIVE | Noted: 2019-07-01

## 2019-09-10 ENCOUNTER — APPOINTMENT (OUTPATIENT)
Dept: NEUROLOGY | Facility: CLINIC | Age: 59
End: 2019-09-10
Payer: MEDICAID

## 2019-09-10 VITALS
BODY MASS INDEX: 24.32 KG/M2 | WEIGHT: 146 LBS | HEART RATE: 75 BPM | HEIGHT: 65 IN | SYSTOLIC BLOOD PRESSURE: 122 MMHG | DIASTOLIC BLOOD PRESSURE: 86 MMHG

## 2019-09-10 PROCEDURE — 95816 EEG AWAKE AND DROWSY: CPT

## 2019-09-10 PROCEDURE — 99214 OFFICE O/P EST MOD 30 MIN: CPT

## 2019-09-10 NOTE — ASSESSMENT
[FreeTextEntry1] : MS. GARCIA is a 58 yr old LH F whom presents  consultation for management of known  seizure disorder. Doing well on combination of levetiracetam 1500 q12 and Depakote 250 q12, but has history of break through convulsion once or twice a year. \par \par Plan: Primary Generalized Seizures\par -reduce levetiracetam  qAM and 1500 qHS\par -continue divalproex 250 q12  \par -RTC 3 mo (preferably Weds afternoon)\par \par I have spent 25 minutes of face to face time with the patient reviewing the cause of seizures or seizure-like events, assessing the risk of recurrence, educating the patient or family to recognize seizures, and discussing possible treatment options and possible side effects of seizure medications. I also discussed seizure safety, and ways of reducing seizure risk. Greater than 50% of the encounter time was spent on counseling and coordination of care for reviewing records in [employer], discussion with patient regarding plan.
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
## 2019-09-13 ENCOUNTER — OTHER (OUTPATIENT)
Age: 59
End: 2019-09-13

## 2019-09-17 ENCOUNTER — APPOINTMENT (OUTPATIENT)
Dept: NEUROLOGY | Facility: CLINIC | Age: 59
End: 2019-09-17

## 2019-11-07 ENCOUNTER — MEDICATION RENEWAL (OUTPATIENT)
Age: 59
End: 2019-11-07

## 2019-12-11 ENCOUNTER — APPOINTMENT (OUTPATIENT)
Dept: NEUROLOGY | Facility: CLINIC | Age: 59
End: 2019-12-11
Payer: MEDICAID

## 2019-12-11 VITALS
BODY MASS INDEX: 24.32 KG/M2 | HEART RATE: 72 BPM | SYSTOLIC BLOOD PRESSURE: 104 MMHG | DIASTOLIC BLOOD PRESSURE: 74 MMHG | WEIGHT: 146 LBS | HEIGHT: 65 IN

## 2019-12-11 PROCEDURE — 99214 OFFICE O/P EST MOD 30 MIN: CPT

## 2019-12-16 NOTE — ASSESSMENT
[FreeTextEntry1] : MS. GARCIA is a 58 yr old LH F whom presents  consultation for management of known  seizure disorder - likely primary generalized eplepsy. Had been on levetiracetam 1500 q12, but had history of break through convulsion once or twice a year. Now doing better on levetiracetam /1500 and divalproex 250 q12. \par \par Plan - \par 1. no change - rtc 6 mo. \par 2. check levetiracetam level. \par \par Greater than 50% of the encounter time was spent on counseling and coordination of care for reviewing records in [employer], discussion with patient regarding plan. I have spent 60 minutes of face to face time with the patient reviewing the cause of seizures or seizure-like events, assessing the risk of recurrence, educating the patient or family to recognize seizures, and discussing possible treatment options and possible side effects of seizure medications. I also discussed seizure safety, and ways of reducing seizure risk. \par
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
## 2020-02-28 ENCOUNTER — RX RENEWAL (OUTPATIENT)
Age: 60
End: 2020-02-28

## 2020-05-12 ENCOUNTER — APPOINTMENT (OUTPATIENT)
Dept: NEUROLOGY | Facility: CLINIC | Age: 60
End: 2020-05-12
Payer: MEDICAID

## 2020-05-12 PROCEDURE — 99442: CPT

## 2020-05-12 RX ORDER — CHROMIUM 200 MCG
1000 TABLET ORAL
Refills: 0 | Status: DISCONTINUED | COMMUNITY
Start: 2017-10-25 | End: 2020-05-12

## 2020-05-12 NOTE — PHYSICAL EXAM
[FreeTextEntry1] : MS intact - alert and oriented x 3, good recall for recent and remote events, speech fluent, answers complex questions appropriately.
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
## 2020-05-12 NOTE — ASSESSMENT
[FreeTextEntry1] : MS. GARCIA is a 58 yr old LH F whom presents  consultation for management of known IGE. Had been relatively well on levetiracetam 1500 q12, but had history of break through convulsion once or twice a year. Depakote added and now has been seizure-free. \par \par Plan\par 1. continue divalproex 250 q12, levetiracetam /1500\par 2. follow-up office visit in 3 mo\par 3. Ms. DAS will get labs drawn by PCP - I will have lab slips sent to Ms. DAS. I also asked Ms. DAS to get copy of DXA results. \par \par Total phone time: 19 minutes\par Total encounter time: 23 minutes.

## 2020-06-09 ENCOUNTER — RX RENEWAL (OUTPATIENT)
Age: 60
End: 2020-06-09

## 2020-08-05 ENCOUNTER — APPOINTMENT (OUTPATIENT)
Dept: NEUROLOGY | Facility: CLINIC | Age: 60
End: 2020-08-05
Payer: MEDICAID

## 2020-08-05 VITALS
WEIGHT: 150 LBS | HEART RATE: 74 BPM | BODY MASS INDEX: 24.99 KG/M2 | SYSTOLIC BLOOD PRESSURE: 124 MMHG | HEIGHT: 65 IN | DIASTOLIC BLOOD PRESSURE: 81 MMHG

## 2020-08-05 VITALS — TEMPERATURE: 94.9 F

## 2020-08-05 PROCEDURE — 99214 OFFICE O/P EST MOD 30 MIN: CPT

## 2020-08-07 NOTE — ASSESSMENT
[FreeTextEntry1] : MS. GARCIA is a 58 yr old LH F whom presents  consultation for management of known  seizure disorder. Doing relatively well on levetiracetam 1500 q12, but has history of break through convulsion once or twice a year. She reports that she has been waking up on floor (4x since last visit) which raises concern for nocturnal convulsion, but (i) not clear why this would suddenly occur now as there have been no med changes, (ii) there are no other sequelae, no incontinence, bruising. \par \par -f/u with labs - Ms. DAS was given contact info for [facility] near her home. \par -c/w LEV  in AM, 1500 in PM \par -cw/  q12\par -RTC 2 mo (preferably Weds afternoon)\par \par I have spent 25 minutes of face to face time with the patient reviewing the cause of seizures or seizure-like events, assessing the risk of recurrence, educating the patient or family to recognize seizures, and discussing possible treatment options and possible side effects of seizure medications. I also discussed seizure safety, and ways of reducing seizure risk. Greater than 50% of the encounter time was spent on counseling and coordination of care for reviewing records in [employer], discussion with patient regarding plan.
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
## 2020-10-07 ENCOUNTER — APPOINTMENT (OUTPATIENT)
Dept: NEUROLOGY | Facility: CLINIC | Age: 60
End: 2020-10-07
Payer: MEDICAID

## 2020-10-07 VITALS
SYSTOLIC BLOOD PRESSURE: 112 MMHG | DIASTOLIC BLOOD PRESSURE: 77 MMHG | WEIGHT: 152 LBS | BODY MASS INDEX: 25.33 KG/M2 | HEART RATE: 79 BPM | HEIGHT: 65 IN

## 2020-10-07 PROCEDURE — 99214 OFFICE O/P EST MOD 30 MIN: CPT

## 2020-10-07 NOTE — ASSESSMENT
[FreeTextEntry1] : MS. GARCIA is a 60 yr old LH F whom presents  consultation for management of known  seizure disorder. Doing relatively well on levetiracetam 1500 q12, but has history of break through convulsion once or twice a year. She reports that she has been waking up on floor (4x since last visit) which raises concern for nocturnal convulsion, but (i) not clear why this would suddenly occur now as there have been no med changes, (ii) there are no other sequelae, no incontinence, bruising. \par \par Plan\par \par -continue  current AED regimen\par - reviewed seizure triggers i.e sleep deprivation\par - reviewed recent labwork from August\par - follow up with Dr De La Vega in 2-3 months\par \par I have spent 25 minutes of face to face time with the patient reviewing the cause of seizures or seizure-like events, assessing the risk of recurrence, educating the patient or family to recognize seizures, and discussing possible treatment options and possible side effects of seizure medications. I also discussed seizure safety, and ways of reducing seizure risk. Greater than 50% of the encounter time was spent on counseling and coordination of care for reviewing records in [employer], discussion with patient regarding plan.
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
## 2020-12-07 ENCOUNTER — APPOINTMENT (OUTPATIENT)
Dept: NEUROLOGY | Facility: CLINIC | Age: 60
End: 2020-12-07
Payer: MEDICAID

## 2020-12-07 VITALS
HEART RATE: 65 BPM | WEIGHT: 157 LBS | HEIGHT: 65 IN | BODY MASS INDEX: 26.16 KG/M2 | SYSTOLIC BLOOD PRESSURE: 121 MMHG | DIASTOLIC BLOOD PRESSURE: 74 MMHG

## 2020-12-07 VITALS — TEMPERATURE: 95.9 F

## 2020-12-07 PROCEDURE — 99072 ADDL SUPL MATRL&STAF TM PHE: CPT

## 2020-12-07 PROCEDURE — 99214 OFFICE O/P EST MOD 30 MIN: CPT

## 2020-12-07 NOTE — ASSESSMENT
[FreeTextEntry1] : MS. GARCIA is a 60 yr old LH F whom presents  consultation for management of known  seizure disorder. Doing relatively well on levetiracetam 1500 q12, but has history of break through convulsion once or twice a year. She reports that she has been waking up on floor (4x since last visit) which raises concern for nocturnal convulsion, but (i) not clear why this would suddenly occur now as there have been no med changes, (ii) there are no other sequelae, no incontinence, bruising. \par \par Plan\par \par -continue  current AED regimen\par - reviewed seizure triggers i.e sleep deprivation\par - follow up with Dr De La Vega in 3-4 months\par \par I have spent 25 minutes of face to face time with the patient reviewing the cause of seizures or seizure-like events, assessing the risk of recurrence, educating the patient or family to recognize seizures, and discussing possible treatment options and possible side effects of seizure medications. I also discussed seizure safety, and ways of reducing seizure risk. Greater than 50% of the encounter time was spent on counseling and coordination of care for reviewing records in [employer], discussion with patient regarding plan.
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
## 2021-04-07 ENCOUNTER — APPOINTMENT (OUTPATIENT)
Dept: NEUROLOGY | Facility: CLINIC | Age: 61
End: 2021-04-07
Payer: MEDICAID

## 2021-04-07 VITALS
HEART RATE: 70 BPM | HEIGHT: 65 IN | DIASTOLIC BLOOD PRESSURE: 80 MMHG | BODY MASS INDEX: 25.33 KG/M2 | WEIGHT: 152 LBS | SYSTOLIC BLOOD PRESSURE: 120 MMHG

## 2021-04-07 VITALS — TEMPERATURE: 97.6 F

## 2021-04-07 PROCEDURE — 99214 OFFICE O/P EST MOD 30 MIN: CPT

## 2021-04-07 PROCEDURE — 99072 ADDL SUPL MATRL&STAF TM PHE: CPT

## 2021-04-07 NOTE — END OF VISIT
[FreeTextEntry3] : Ms. TALIA DAS was seen with epilepsy nurse practitioner Prerna De Los Santos.  I reviewed history with patient and family, examined Ms. DAS , and edited the note.

## 2021-04-07 NOTE — ASSESSMENT
[FreeTextEntry1] : MS. GARCIA is a 60 yr old LH F whom presents  consultation for management of known  seizure disorder. Doing relatively well on levetiracetam 1500 q12, but has history of break through convulsion once or twice a year. She reports that she has been waking up on floor (4x since last visit) which raises concern for nocturnal convulsion, but (i) not clear why this would suddenly occur now as there have been no med changes, (ii) there are no other sequelae, no incontinence, bruising. \par \par Plan\par \par - continue  current AED regimen\par - reviewed seizure triggers i.e sleep deprivation\par - recommended getting the COVID-19 vaccine\par - follow up in 6 months\par \par I have spent 30 minutes or longer reviewing patient data or discussing with the patient  the cause of seizures or seizure-like events and comorbid conditions, assessing the risk of recurrence, educating the patient or family to recognize seizures, and discussing possible treatment options for seizures and comorbid conditions and possible side effects of medications. I also discussed seizure safety, and ways of reducing seizure risk. Greater than 50% of the encounter time was spent on counseling and coordination of care for reviewing records in [employer], discussion with patient regarding plan.
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
## 2021-05-11 ENCOUNTER — RESULT REVIEW (OUTPATIENT)
Age: 61
End: 2021-05-11

## 2021-05-18 ENCOUNTER — RX RENEWAL (OUTPATIENT)
Age: 61
End: 2021-05-18

## 2021-08-17 ENCOUNTER — RX RENEWAL (OUTPATIENT)
Age: 61
End: 2021-08-17

## 2021-10-08 ENCOUNTER — APPOINTMENT (OUTPATIENT)
Dept: NEUROLOGY | Facility: CLINIC | Age: 61
End: 2021-10-08
Payer: MEDICAID

## 2021-10-08 VITALS
HEART RATE: 76 BPM | DIASTOLIC BLOOD PRESSURE: 87 MMHG | SYSTOLIC BLOOD PRESSURE: 122 MMHG | BODY MASS INDEX: 26.66 KG/M2 | HEIGHT: 65 IN | WEIGHT: 160 LBS

## 2021-10-08 PROCEDURE — 99214 OFFICE O/P EST MOD 30 MIN: CPT

## 2021-10-08 NOTE — ASSESSMENT
[FreeTextEntry1] : MS. GARCIA is a 58 yr old LH F whom presents  consultation for management of known  seizure disorder. Doing relatively well on levetiracetam 1500 q12 with divalproex 250 q12.  It is unclear whether seizures reported were convulsions.  It is somewhat physiologically implausible that convulsions occurring within 1 or 2 hours of receiving mRNA vaccine intramuscularly were due to vaccine.\par \par Primary Generalized Seizures\par -c/w LEV 1500mg bid and divalproex to 50 mg twice a day\par -RTC 6 mo \par -I endorsed recommendation for flu vaccine, and indicated that at present 3rd dose of Covid vaccine is not recommended\par \par I have spent 30 minutes or longer reviewing patient data or discussing with the patient  the cause of seizures or seizure-like events and comorbid conditions, assessing the risk of recurrence, educating the patient or family to recognize seizures, and discussing possible treatment options for seizures and comorbid conditions and possible side effects of medications. I also discussed seizure safety, and ways of reducing seizure risk. Greater than 50% of the encounter time was spent on counseling and coordination of care for reviewing records in [employer], discussion with patient regarding plan.
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
## 2022-04-06 ENCOUNTER — APPOINTMENT (OUTPATIENT)
Dept: NEUROLOGY | Facility: CLINIC | Age: 62
End: 2022-04-06
Payer: MEDICAID

## 2022-04-06 VITALS
SYSTOLIC BLOOD PRESSURE: 125 MMHG | HEIGHT: 65 IN | DIASTOLIC BLOOD PRESSURE: 86 MMHG | BODY MASS INDEX: 25.99 KG/M2 | WEIGHT: 156 LBS | HEART RATE: 80 BPM

## 2022-04-06 PROCEDURE — 99214 OFFICE O/P EST MOD 30 MIN: CPT

## 2022-04-06 NOTE — ASSESSMENT
[FreeTextEntry1] : MS. GARCIA is a 58 yr old LH F whom presents  consultation for management of known seizure disorder. Doing relatively well on levetiracetam 1500 q12 with divalproex 250 q12.  \par \par Primary Generalized Seizures\par -c/w LEV 1500mg bid and divalproex 250 mg twice a day\par -RTC Nov 2022\par \par I have spent 30 minutes or longer reviewing patient data or discussing with the patient  the cause of seizures or seizure-like events and comorbid conditions, assessing the risk of recurrence, educating the patient or family to recognize seizures, and discussing possible treatment options for seizures and comorbid conditions and possible side effects of medications. I also discussed seizure safety, and ways of reducing seizure risk. Greater than 50% of the encounter time was spent on counseling and coordination of care for reviewing records in [employer], discussion with patient regarding plan.
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
## 2022-05-18 ENCOUNTER — RX RENEWAL (OUTPATIENT)
Age: 62
End: 2022-05-18

## 2022-10-07 ENCOUNTER — APPOINTMENT (OUTPATIENT)
Dept: NEUROLOGY | Facility: CLINIC | Age: 62
End: 2022-10-07

## 2022-10-07 VITALS
SYSTOLIC BLOOD PRESSURE: 126 MMHG | DIASTOLIC BLOOD PRESSURE: 92 MMHG | HEART RATE: 72 BPM | HEIGHT: 65 IN | TEMPERATURE: 98 F

## 2022-10-07 PROCEDURE — 99213 OFFICE O/P EST LOW 20 MIN: CPT

## 2022-10-09 NOTE — ASSESSMENT
[FreeTextEntry1] : MS. GARCIA is a 62 yr old LH F whom presents  consultation for management of known seizure disorder. Doing relatively well on levetiracetam 1500 q12 with divalproex 250 q12.  \par Primary Generalized Seizures\par \par Plan\par -continue Levetiracetam 1500mg bid and Divalproex 250 mg twice a day\par -reviewed recent labs she brought in  (scanned to chart)\par -Bone density  revealed osteopenia - advised to follow up with PCP-encoutaged 3 servings Calcium daily and low  weight bearing exercises\par -continue to monitor right leg parasthesia (now that not using airconditioner\par -follow up with Dr De La Vega in 4 months call if any seizures\par \par
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
## 2022-11-09 ENCOUNTER — RX RENEWAL (OUTPATIENT)
Age: 62
End: 2022-11-09

## 2023-01-04 ENCOUNTER — APPOINTMENT (OUTPATIENT)
Dept: NEUROLOGY | Facility: CLINIC | Age: 63
End: 2023-01-04
Payer: MEDICAID

## 2023-01-04 VITALS
WEIGHT: 150 LBS | HEIGHT: 65 IN | DIASTOLIC BLOOD PRESSURE: 82 MMHG | BODY MASS INDEX: 24.99 KG/M2 | SYSTOLIC BLOOD PRESSURE: 112 MMHG | HEART RATE: 82 BPM

## 2023-01-04 PROCEDURE — 99214 OFFICE O/P EST MOD 30 MIN: CPT

## 2023-01-04 NOTE — ASSESSMENT
[FreeTextEntry1] : MS. GARCIA is a 58 yr old LH F whom presents  consultation for management of known seizure disorder. Doing relatively well on levetiracetam 1500 q12 with divalproex 250 q12 with a breakthrough seizure every year or two.  Most recent breakthrough was 9/21/22 - in setting of lower than usual divalproex level.  \par \par Primary Generalized Seizures\par -c/w LEV ER 1500mg bid and divalproex  mg twice a day\par -RTC 6 mo\par -check divalproex trough level prior to next visit. \par \par I have spent 30 minutes or longer reviewing patient data or discussing with the patient  the cause of seizures or seizure-like events and comorbid conditions, assessing the risk of recurrence, educating the patient or family to recognize seizures, and discussing possible treatment options for seizures and comorbid conditions and possible side effects of medications. I also discussed seizure safety, and ways of reducing seizure risk. Greater than 50% of the encounter time was spent on counseling and coordination of care for reviewing records in [employer], discussion with patient regarding plan.
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
## 2023-08-11 ENCOUNTER — NON-APPOINTMENT (OUTPATIENT)
Age: 63
End: 2023-08-11

## 2023-11-06 ENCOUNTER — RX RENEWAL (OUTPATIENT)
Age: 63
End: 2023-11-06

## 2023-11-07 ENCOUNTER — APPOINTMENT (OUTPATIENT)
Dept: NEUROLOGY | Facility: CLINIC | Age: 63
End: 2023-11-07
Payer: MEDICARE

## 2023-11-07 VITALS
BODY MASS INDEX: 24.99 KG/M2 | HEIGHT: 65 IN | WEIGHT: 150 LBS | HEART RATE: 77 BPM | SYSTOLIC BLOOD PRESSURE: 111 MMHG | DIASTOLIC BLOOD PRESSURE: 76 MMHG

## 2023-11-07 DIAGNOSIS — M85.80 OTHER SPECIFIED DISORDERS OF BONE DENSITY AND STRUCTURE, UNSPECIFIED SITE: ICD-10-CM

## 2023-11-07 PROCEDURE — 99214 OFFICE O/P EST MOD 30 MIN: CPT

## 2023-11-07 RX ORDER — DIVALPROEX SODIUM 250 MG/1
250 TABLET, DELAYED RELEASE ORAL
Qty: 180 | Refills: 3 | Status: DISCONTINUED | COMMUNITY
Start: 2020-02-28 | End: 2023-11-07

## 2023-11-09 ENCOUNTER — RX RENEWAL (OUTPATIENT)
Age: 63
End: 2023-11-09

## 2023-11-09 RX ORDER — DIVALPROEX SODIUM 250 MG/1
250 TABLET, EXTENDED RELEASE ORAL
Qty: 180 | Refills: 1 | Status: ACTIVE | COMMUNITY
Start: 2023-01-04 | End: 1900-01-01

## 2023-11-10 ENCOUNTER — RX RENEWAL (OUTPATIENT)
Age: 63
End: 2023-11-10

## 2023-11-10 RX ORDER — LEVETIRACETAM 750 MG/1
750 TABLET, EXTENDED RELEASE ORAL
Qty: 360 | Refills: 3 | Status: ACTIVE | COMMUNITY
Start: 2019-07-31 | End: 1900-01-01

## 2023-12-31 ENCOUNTER — NON-APPOINTMENT (OUTPATIENT)
Age: 63
End: 2023-12-31

## 2024-03-25 ENCOUNTER — APPOINTMENT (OUTPATIENT)
Dept: NEUROLOGY | Facility: CLINIC | Age: 64
End: 2024-03-25
Payer: MEDICARE

## 2024-03-25 VITALS
HEIGHT: 65 IN | SYSTOLIC BLOOD PRESSURE: 116 MMHG | BODY MASS INDEX: 24.99 KG/M2 | DIASTOLIC BLOOD PRESSURE: 75 MMHG | WEIGHT: 150 LBS | HEART RATE: 70 BPM

## 2024-03-25 DIAGNOSIS — G40.B19 JUVENILE MYOCLONIC EPILEPSY, INTRACTABLE, W/OUT STATUS EPILEPTICUS: ICD-10-CM

## 2024-03-25 PROCEDURE — 99213 OFFICE O/P EST LOW 20 MIN: CPT

## 2024-03-25 NOTE — PHYSICAL EXAM
[FreeTextEntry1] : alert and oriented x 3, speech fluent, names easily, follows requests, good recall for recent and remote events.\par  EOM full without sustained nystagmus, PERRL, face symmetrical, no dysarthria\par  Motor - full strength in all extremities. normal rapid-alternating movements.\par  Sensory - intact LT bilaterally\par  Coord - no tremor, ataxia\par  Gait - stands without difficulty, normal gait.

## 2024-03-25 NOTE — ASSESSMENT
[FreeTextEntry1] : MS. GARCIA is a 58 yr old LH F whom presents consultation for management of known seizure disorder. Doing relatively well on levetiracetam /1500 with divalproex 250 q12. Most recent breakthrough was 9/21/22 - in setting of lower than usual divalproex level.  Primary Generalized Seizures -c/w LEV  mg / 1500 mg  and divalproex  mg twice a day -RTC 6 mo
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
## 2024-07-15 ENCOUNTER — RX RENEWAL (OUTPATIENT)
Age: 64
End: 2024-07-15

## 2024-07-29 ENCOUNTER — RX RENEWAL (OUTPATIENT)
Age: 64
End: 2024-07-29

## 2024-09-25 ENCOUNTER — APPOINTMENT (OUTPATIENT)
Dept: NEUROLOGY | Facility: CLINIC | Age: 64
End: 2024-09-25

## 2024-11-18 ENCOUNTER — RX RENEWAL (OUTPATIENT)
Age: 64
End: 2024-11-18

## 2024-11-26 ENCOUNTER — RX RENEWAL (OUTPATIENT)
Age: 64
End: 2024-11-26

## 2025-04-07 ENCOUNTER — RX RENEWAL (OUTPATIENT)
Age: 65
End: 2025-04-07

## 2025-05-13 ENCOUNTER — APPOINTMENT (OUTPATIENT)
Dept: NEUROLOGY | Facility: CLINIC | Age: 65
End: 2025-05-13
Payer: MEDICARE

## 2025-05-13 VITALS
BODY MASS INDEX: 24.99 KG/M2 | HEART RATE: 68 BPM | WEIGHT: 150 LBS | OXYGEN SATURATION: 97 % | DIASTOLIC BLOOD PRESSURE: 78 MMHG | HEIGHT: 65 IN | SYSTOLIC BLOOD PRESSURE: 109 MMHG

## 2025-05-13 DIAGNOSIS — G40.B19 JUVENILE MYOCLONIC EPILEPSY, INTRACTABLE, W/OUT STATUS EPILEPTICUS: ICD-10-CM

## 2025-05-13 PROCEDURE — 99214 OFFICE O/P EST MOD 30 MIN: CPT

## 2025-08-20 ENCOUNTER — RX RENEWAL (OUTPATIENT)
Age: 65
End: 2025-08-20